# Patient Record
Sex: MALE | Race: WHITE | NOT HISPANIC OR LATINO | Employment: FULL TIME | URBAN - METROPOLITAN AREA
[De-identification: names, ages, dates, MRNs, and addresses within clinical notes are randomized per-mention and may not be internally consistent; named-entity substitution may affect disease eponyms.]

---

## 2024-04-08 PROBLEM — J11.1 INFLUENZA: Status: ACTIVE | Noted: 2024-04-08

## 2024-04-08 PROBLEM — J34.3 HYPERTROPHY OF INFERIOR NASAL TURBINATE: Status: ACTIVE | Noted: 2024-04-08

## 2024-04-08 PROBLEM — I10 HYPERTENSION: Status: ACTIVE | Noted: 2024-04-08

## 2024-04-08 PROBLEM — I25.10 CAD (CORONARY ARTERY DISEASE): Status: ACTIVE | Noted: 2024-04-08

## 2024-04-08 PROBLEM — J34.2 DEVIATED SEPTUM: Status: ACTIVE | Noted: 2024-04-08

## 2024-04-08 PROBLEM — G47.19 EXCESSIVE DAYTIME SLEEPINESS: Status: ACTIVE | Noted: 2024-04-08

## 2024-04-08 PROBLEM — G62.9 PERIPHERAL NEUROPATHY: Status: ACTIVE | Noted: 2024-04-08

## 2024-04-08 PROBLEM — J30.9 ALLERGIC RHINITIS: Status: ACTIVE | Noted: 2024-04-08

## 2024-04-08 PROBLEM — I21.4 NON-STEMI (NON-ST ELEVATED MYOCARDIAL INFARCTION) (MULTI): Status: ACTIVE | Noted: 2024-04-08

## 2024-04-08 PROBLEM — R68.82 LOSS OF LIBIDO: Status: ACTIVE | Noted: 2024-04-08

## 2024-04-08 PROBLEM — N52.9 ERECTILE DYSFUNCTION: Status: ACTIVE | Noted: 2024-04-08

## 2024-04-08 PROBLEM — R07.9 CHEST PAIN: Status: ACTIVE | Noted: 2024-04-08

## 2024-04-08 PROBLEM — R79.89 ABNORMAL CBC: Status: ACTIVE | Noted: 2024-04-08

## 2024-04-08 PROBLEM — E78.5 HYPERLIPIDEMIA: Status: ACTIVE | Noted: 2024-04-08

## 2024-04-08 PROBLEM — G45.9 TIA (TRANSIENT ISCHEMIC ATTACK): Status: ACTIVE | Noted: 2024-04-08

## 2024-04-08 PROBLEM — R20.2 PARESTHESIAS: Status: ACTIVE | Noted: 2024-04-08

## 2024-04-08 PROBLEM — R09.81 CHRONIC NASAL CONGESTION: Status: ACTIVE | Noted: 2024-04-08

## 2024-04-08 PROBLEM — G47.33 OBSTRUCTIVE SLEEP APNEA: Status: ACTIVE | Noted: 2024-04-08

## 2024-04-09 ENCOUNTER — OFFICE VISIT (OUTPATIENT)
Dept: CARDIOLOGY | Facility: HOSPITAL | Age: 59
End: 2024-04-09
Payer: COMMERCIAL

## 2024-04-09 ENCOUNTER — LAB (OUTPATIENT)
Dept: LAB | Facility: LAB | Age: 59
End: 2024-04-09
Payer: COMMERCIAL

## 2024-04-09 VITALS
DIASTOLIC BLOOD PRESSURE: 94 MMHG | HEART RATE: 75 BPM | HEIGHT: 69 IN | WEIGHT: 222.3 LBS | SYSTOLIC BLOOD PRESSURE: 137 MMHG | BODY MASS INDEX: 32.92 KG/M2

## 2024-04-09 DIAGNOSIS — I25.2 PAST MYOCARDIAL INFARCTION: ICD-10-CM

## 2024-04-09 DIAGNOSIS — I25.10 CORONARY ARTERY DISEASE INVOLVING NATIVE CORONARY ARTERY OF NATIVE HEART WITHOUT ANGINA PECTORIS: Primary | ICD-10-CM

## 2024-04-09 DIAGNOSIS — I25.10 CORONARY ARTERY DISEASE INVOLVING NATIVE CORONARY ARTERY OF NATIVE HEART WITHOUT ANGINA PECTORIS: ICD-10-CM

## 2024-04-09 DIAGNOSIS — E78.00 PURE HYPERCHOLESTEROLEMIA: ICD-10-CM

## 2024-04-09 DIAGNOSIS — I10 PRIMARY HYPERTENSION: ICD-10-CM

## 2024-04-09 LAB
ALBUMIN SERPL BCP-MCNC: 4.6 G/DL (ref 3.4–5)
ALP SERPL-CCNC: 44 U/L (ref 33–120)
ALT SERPL W P-5'-P-CCNC: 18 U/L (ref 10–52)
ANION GAP SERPL CALC-SCNC: 12 MMOL/L (ref 10–20)
AST SERPL W P-5'-P-CCNC: 18 U/L (ref 9–39)
ATRIAL RATE: 75 BPM
BILIRUB SERPL-MCNC: 0.9 MG/DL (ref 0–1.2)
BUN SERPL-MCNC: 20 MG/DL (ref 6–23)
CALCIUM SERPL-MCNC: 9.3 MG/DL (ref 8.6–10.3)
CHLORIDE SERPL-SCNC: 102 MMOL/L (ref 98–107)
CHOLEST SERPL-MCNC: 232 MG/DL (ref 0–199)
CHOLESTEROL/HDL RATIO: 3.3
CO2 SERPL-SCNC: 28 MMOL/L (ref 21–32)
CREAT SERPL-MCNC: 1.11 MG/DL (ref 0.5–1.3)
EGFRCR SERPLBLD CKD-EPI 2021: 78 ML/MIN/1.73M*2
ERYTHROCYTE [DISTWIDTH] IN BLOOD BY AUTOMATED COUNT: 12 % (ref 11.5–14.5)
EST. AVERAGE GLUCOSE BLD GHB EST-MCNC: 97 MG/DL
GLUCOSE SERPL-MCNC: 103 MG/DL (ref 74–99)
HBA1C MFR BLD: 5 %
HCT VFR BLD AUTO: 43.4 % (ref 41–52)
HDLC SERPL-MCNC: 70.8 MG/DL
HGB BLD-MCNC: 15.3 G/DL (ref 13.5–17.5)
LDLC SERPL CALC-MCNC: 143 MG/DL
MCH RBC QN AUTO: 32.7 PG (ref 26–34)
MCHC RBC AUTO-ENTMCNC: 35.3 G/DL (ref 32–36)
MCV RBC AUTO: 93 FL (ref 80–100)
NON HDL CHOLESTEROL: 161 MG/DL (ref 0–149)
NRBC BLD-RTO: 0 /100 WBCS (ref 0–0)
P AXIS: 67 DEGREES
P OFFSET: 201 MS
P ONSET: 142 MS
PLATELET # BLD AUTO: 144 X10*3/UL (ref 150–450)
POTASSIUM SERPL-SCNC: 4.3 MMOL/L (ref 3.5–5.3)
PR INTERVAL: 152 MS
PROT SERPL-MCNC: 6.9 G/DL (ref 6.4–8.2)
Q ONSET: 218 MS
QRS COUNT: 12 BEATS
QRS DURATION: 90 MS
QT INTERVAL: 386 MS
QTC CALCULATION(BAZETT): 431 MS
QTC FREDERICIA: 415 MS
R AXIS: 33 DEGREES
RBC # BLD AUTO: 4.68 X10*6/UL (ref 4.5–5.9)
SODIUM SERPL-SCNC: 138 MMOL/L (ref 136–145)
T AXIS: 28 DEGREES
T OFFSET: 411 MS
TRIGL SERPL-MCNC: 92 MG/DL (ref 0–149)
VENTRICULAR RATE: 75 BPM
VLDL: 18 MG/DL (ref 0–40)
WBC # BLD AUTO: 3.8 X10*3/UL (ref 4.4–11.3)

## 2024-04-09 PROCEDURE — 99214 OFFICE O/P EST MOD 30 MIN: CPT | Performed by: INTERNAL MEDICINE

## 2024-04-09 PROCEDURE — 82172 ASSAY OF APOLIPOPROTEIN: CPT

## 2024-04-09 PROCEDURE — 3080F DIAST BP >= 90 MM HG: CPT | Performed by: INTERNAL MEDICINE

## 2024-04-09 PROCEDURE — 80061 LIPID PANEL: CPT

## 2024-04-09 PROCEDURE — 36415 COLL VENOUS BLD VENIPUNCTURE: CPT

## 2024-04-09 PROCEDURE — 93005 ELECTROCARDIOGRAM TRACING: CPT | Performed by: INTERNAL MEDICINE

## 2024-04-09 PROCEDURE — 80053 COMPREHEN METABOLIC PANEL: CPT

## 2024-04-09 PROCEDURE — 99204 OFFICE O/P NEW MOD 45 MIN: CPT | Performed by: INTERNAL MEDICINE

## 2024-04-09 PROCEDURE — 3074F SYST BP LT 130 MM HG: CPT | Performed by: INTERNAL MEDICINE

## 2024-04-09 PROCEDURE — 85027 COMPLETE CBC AUTOMATED: CPT

## 2024-04-09 PROCEDURE — 1036F TOBACCO NON-USER: CPT | Performed by: INTERNAL MEDICINE

## 2024-04-09 PROCEDURE — 83036 HEMOGLOBIN GLYCOSYLATED A1C: CPT

## 2024-04-09 RX ORDER — AMLODIPINE BESYLATE 10 MG/1
10 TABLET ORAL DAILY
COMMUNITY

## 2024-04-09 RX ORDER — SILDENAFIL 100 MG/1
100 TABLET, FILM COATED ORAL
COMMUNITY
Start: 2017-06-09

## 2024-04-09 RX ORDER — ROSUVASTATIN CALCIUM 20 MG/1
20 TABLET, COATED ORAL DAILY
Qty: 90 TABLET | Refills: 3 | Status: SHIPPED | OUTPATIENT
Start: 2024-04-09 | End: 2025-04-09

## 2024-04-09 RX ORDER — OLMESARTAN MEDOXOMIL 40 MG/1
40 TABLET ORAL DAILY
Qty: 90 TABLET | Refills: 3 | Status: SHIPPED | OUTPATIENT
Start: 2024-04-09 | End: 2025-04-09

## 2024-04-09 RX ORDER — ASPIRIN 81 MG/1
1 TABLET ORAL DAILY
COMMUNITY
Start: 2016-07-19

## 2024-04-09 RX ORDER — LISINOPRIL 40 MG/1
40 TABLET ORAL DAILY
COMMUNITY
End: 2024-04-09 | Stop reason: ALTCHOICE

## 2024-04-09 ASSESSMENT — ENCOUNTER SYMPTOMS
DEPRESSION: 0
LOSS OF SENSATION IN FEET: 0
OCCASIONAL FEELINGS OF UNSTEADINESS: 0

## 2024-04-09 NOTE — PROGRESS NOTES
Primary Care Physician: Aston Beltran MD  Date of Visit: 04/09/2024  8:20 AM EDT  Location of visit: [facility]     Chief Complaint:   Chief Complaint   Patient presents with    Re-establish Care    Coronary Artery Disease    Hypertension        HPI / Summary:   Greg Ortiz is a 58 y.o. male presents to reestablish cardiovascular care.  I last saw him in 2021.  He has had no intervening history since then.  He has no cardiac complaints.  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  The patient denies chest pain, shortness of breath, palpitations, lightheadedness, syncope, orthopnea, paroxysmal nocturnal dyspnea, lower extremity edema, or bleeding problems.    He stopped taking statins several years ago.  He has a history of myalgias with rosuvastatin and atorvastatin.    He is a lifelong non-smoker.    He has no known drug allergies.    There is no family history of coronary artery disease or sudden death.  His mother had a DVT.    Review of systems positive for chronic low back and knee pain.  He has also gained approximately 50 pounds in the last 3 years.      Past Medical History:   Diagnosis Date    Other cerebral infarction due to occlusion or stenosis of small artery (CMS/[facility]) 08/14/2014    Lacunar stroke    Personal history of other diseases of the circulatory system     History of coronary artery disease    Personal history of other diseases of the nervous system and sense organs     History of sleep apnea    Personal history of other specified conditions     History of snoring        Past Surgical History:   Procedure Laterality Date    MANDIBLE SURGERY  11/24/2015    Jaw Surgery    OTHER SURGICAL HISTORY  11/24/2015    Previous Stent Placement          Social History:        Family History:  family history is not on file.      Allergies:  No Known Allergies    Outpatient Medications:  Current Outpatient  "Medications   Medication Instructions    amLODIPine (NORVASC) 10 mg, oral, Daily    aspirin 81 mg EC tablet 1 tablet, oral, Daily    lisinopril 40 mg, oral, Daily    sildenafil (VIAGRA) 100 mg, oral       Physical Exam:  Vitals:    04/09/24 0836 04/09/24 0837   BP: 129/90 (!) 137/94   BP Location: Left arm Right arm   Patient Position: Sitting Sitting   BP Cuff Size: Adult Adult   Pulse: 75    Weight: 101 kg (222 lb 4.8 oz)    Height: 1.753 m (5' 9\")      Wt Readings from Last 5 Encounters:   04/09/24 101 kg (222 lb 4.8 oz)   04/01/21 78.7 kg (173 lb 8 oz)   02/16/21 79.8 kg (176 lb 0.3 oz)     Body mass index is 32.83 kg/m².   General: Well-developed well-nourished in no acute distress  HEENT: Normocephalic atraumatic  Neck: Supple, JVP is normal negative hepatojugular reflux 2+ carotid pulses without bruit  Pulmonary: Normal respiratory effort, clear to auscultation  Cardiovascular: No right ventricular heave, normal S1 and S2, no murmurs rubs or gallops  Abdomen: Soft nontender nondistended  Extremities: Warm without edema 2+ radial pulses bilaterally 2+ dorsalis pedis pulses bilaterally  Neurologic: Alert and oriented x3  Psychiatric: Normal mood and affect     Last Labs:  CMP:  Recent Labs     02/15/21  0933 10/19/19  0941 10/01/19  1535    138 136   K 4.5 4.7 4.0    100 101   CO2 28 29 27   ANIONGAP 13 14 12   BUN 19 14 23   CREATININE 0.96 0.96 1.65*   GLUCOSE 87 80 118*     Recent Labs     02/15/21  0933 10/01/19  1535   ALBUMIN 4.3 4.4   ALKPHOS 52 46   ALT 26 35   AST 25 34   BILITOT 0.8 0.8     CBC:  Recent Labs     02/15/21  0933 10/01/19  1535 09/12/18  0850   WBC 3.4* 4.3* 3.1*   HGB 14.9 15.4 15.3   HCT 43.7 43.5 43.4   * 129* 138*   * 95 98     COAG:   Recent Labs     10/01/19  1535   INR 1.0     HEME/ENDO:No results for input(s): \"FERRITIN\", \"IRONSAT\", \"TSH\", \"HGBA1C\" in the last 64194 hours.   CARDIAC: No results for input(s): \"LDH\", \"CKMB\", \"TROPHS\", \"BNP\" in the last " "43070 hours.    No lab exists for component: \"CK\", \"CKMBP\"  Recent Labs     02/15/21  0933 10/19/19  0941 09/12/18  0850   CHOL 201* 224* 194   LDLF 100* 120* 122*   HDL 89.8 83.2 57.9   TRIG 55 105 73       Last Cardiology Tests:  ECG:  Electrocardiogram performed today that I reviewed shows normal sinus rhythm nonspecific ST abnormality.    Echo:  Echocardiogram November 6, 2015   CONCLUSIONS:   1. The left ventricular systolic function is normal with a 55-60% estimated ejection fraction.   2. Spectral Doppler shows an impaired relaxation pattern of left ventricular diastolic filling.   3. The left atrium is normal in size.    Cath:  Cardiac catheterization November 9, 2015  Coronary Lesion Summary:  Vessel Stenosis     Vessel Segment  LAD    90% stenosis proximal  LPL    80% stenosis mid  Ramus  90% stenosis mid  CONCLUSIONS:   1. Successful OCT guided PCI of the proximal LAD with overlapping 2.75 x 8 mm and 3.0 x 18 mm Xience Alpine drug-eluting stents post dilated up to 3.5 mm.   2. Successful PCI of the mid ramus with a 2.25 x 15 mm Xience Alpine post dilated up to 2.5 mm.    Cardiac catheterization November 5, 2015  Coronary Lesion Summary:  Vessel Stenosis      Vessel Segment             Length (mm)  LAD    90% stenosis  proximal  LAD    95% stenosis  distal third of the distal  OM 1   100% stenosis proximal                   12  LPL    70% stenosis  mid  Ramus  90% stenosis  mid  CONCLUSIONS:   1. Severe multivessel CAD in a left dominant system.   2. Culprit vessel(s): 1st obtuse marginal.   3. Successful PCI of the proximal first obtuse marginal branch with a 2.5 x 15 mm Xience Alpine drug-eluting stent postdilated to 2.5 mm.   4. Elevated LVEDP.   5. No aortic stenosis.    Stress Test:  Exercise nuclear stress test July 15, 2016  The patient performed a Valentin protocol and achieved 10.1 Mets and 92   % of age predicted maximum heart rate.  Exercise was terminated   secondary to dyspnea.     No changing " pattern is present between stress and rest to suggest   ischemia.  A mild fixed decrease is present within the inferior wall   typical for diaphragmatic attenuation artifact.     The left ventricular  ejection fraction measures 54 % (normal is   greater than 45 %).  Contractility is normal.     IMPRESSION: Normal stress myocardial perfusion imaging with   diaphragmatic attenuation artifact.      Cardiac Imaging:        Assessment/Plan   Diagnoses and all orders for this visit:  Coronary artery disease involving native coronary artery of native heart without angina pectoris  -     ECG 12 lead (Clinic Performed)  -     Comprehensive metabolic panel; Future  -     CBC; Future  -     Lipid panel; Future  -     Hemoglobin A1C; Future  -     Lipoprotein a; Future  -     rosuvastatin (Crestor) 20 mg tablet; Take 1 tablet (20 mg) by mouth once daily.  -     Lipid panel; Future  Pure hypercholesterolemia  -     Lipid panel; Future  -     Lipoprotein a; Future  -     Lipid panel; Future  Primary hypertension  -     olmesartan (BENIcar) 40 mg tablet; Take 1 tablet (40 mg) by mouth once daily.  -     Basic metabolic panel; Future  Past myocardial infarction    In summary Mr. Ortiz is a pleasant 58 year-old white male with a past medical history significant for coronary artery disease status post PCI to OM1 in the setting of a non-ST elevation myocardial infarction and subsequent staged PCI to his LAD and ramus with preserved LV function, hypertension, hyperlipidemia with statin intolerance, and prior TIA.  He is asymptomatic from a cardiac perspective.  I educated him the importance of physical activity, weight loss, diet, blood pressure control, and LDL lowering.  His blood pressure is not at goal.  I switched his lisinopril to olmesartan 40 mg daily.  I asked him to monitor his blood pressure at home.  He was agreeable to trying a statin again.  I started rosuvastatin 20 mg daily.  If he is not at goal I would recommend a  PCSK9 inhibitor.  I ordered labs as indicated below.  He should continue his other cardiovascular medications.  We will see him back in follow-up in 3 months.      Orders:  Orders Placed This Encounter   Procedures    Comprehensive metabolic panel    CBC    Lipid panel    Hemoglobin A1C    Lipoprotein a    Basic metabolic panel    Lipid panel    ECG 12 lead (Clinic Performed)      Followup Appts:  Future Appointments   Date Time Provider Department Center   [facility]20[facility]   7/23/2024  9:00 AM Lluvia Moreland, APRN-CNP [id number]           ____________________________________________________________  Brad Vergara MD  [facility]  [facility]

## 2024-04-09 NOTE — PATIENT INSTRUCTIONS
Stop lisinopril.  Start olmesartan 40 mg daily.  Start rosuvastatin 20 mg daily.  Check CMP, CBC, lipid profile, A1c, and LP(a) today.  Repeat a BMP in 1 to 2 weeks.  Repeat a fasting lipid profile in 6 to 8 weeks.  Increase your physical activity, modify your diet, and continue to work on losing weight.  Monitor your blood pressure at home.  Follow-up in 3 months.

## 2024-04-11 LAB — LPA SERPL-MCNC: 54 MG/DL

## 2024-05-30 NOTE — RESULT ENCOUNTER NOTE
Elevated LP(a).  Significantly elevated lipids.  Normal hemoglobin A1c.  Continue rosuvastatin recheck lipid profile in July as discussed.  Mildly reduced white blood cell count and platelet count similar to prior.  Follow-up with your primary physician.

## 2024-07-14 LAB
ATRIAL RATE: 75 BPM
P AXIS: 67 DEGREES
P OFFSET: 201 MS
P ONSET: 142 MS
PR INTERVAL: 152 MS
Q ONSET: 218 MS
QRS COUNT: 12 BEATS
QRS DURATION: 90 MS
QT INTERVAL: 386 MS
QTC CALCULATION(BAZETT): 431 MS
QTC FREDERICIA: 415 MS
R AXIS: 33 DEGREES
T AXIS: 28 DEGREES
T OFFSET: 411 MS
VENTRICULAR RATE: 75 BPM

## 2024-07-23 ENCOUNTER — APPOINTMENT (OUTPATIENT)
Dept: CARDIOLOGY | Facility: HOSPITAL | Age: 59
End: 2024-07-23
Payer: COMMERCIAL

## 2025-08-12 ENCOUNTER — APPOINTMENT (OUTPATIENT)
Dept: CARDIOLOGY | Facility: HOSPITAL | Age: 60
End: 2025-08-12
Payer: OTHER GOVERNMENT

## 2025-08-12 ENCOUNTER — HOSPITAL ENCOUNTER (EMERGENCY)
Facility: HOSPITAL | Age: 60
Discharge: HOME | End: 2025-08-12
Attending: EMERGENCY MEDICINE
Payer: OTHER GOVERNMENT

## 2025-08-12 ENCOUNTER — APPOINTMENT (OUTPATIENT)
Dept: RADIOLOGY | Facility: HOSPITAL | Age: 60
End: 2025-08-12
Payer: OTHER GOVERNMENT

## 2025-08-12 VITALS
WEIGHT: 210 LBS | BODY MASS INDEX: 31.1 KG/M2 | TEMPERATURE: 97.9 F | SYSTOLIC BLOOD PRESSURE: 119 MMHG | DIASTOLIC BLOOD PRESSURE: 85 MMHG | OXYGEN SATURATION: 98 % | RESPIRATION RATE: 13 BRPM | HEIGHT: 69 IN | HEART RATE: 53 BPM

## 2025-08-12 DIAGNOSIS — R42 LIGHTHEADEDNESS: Primary | ICD-10-CM

## 2025-08-12 LAB
ALBUMIN SERPL BCP-MCNC: 4.5 G/DL (ref 3.4–5)
ALP SERPL-CCNC: 48 U/L (ref 33–120)
ALT SERPL W P-5'-P-CCNC: 14 U/L (ref 10–52)
ANION GAP SERPL CALC-SCNC: 17 MMOL/L (ref 10–20)
AST SERPL W P-5'-P-CCNC: 19 U/L (ref 9–39)
BASOPHILS # BLD AUTO: 0.01 X10*3/UL (ref 0–0.1)
BASOPHILS NFR BLD AUTO: 0.2 %
BILIRUB SERPL-MCNC: 1.5 MG/DL (ref 0–1.2)
BUN SERPL-MCNC: 21 MG/DL (ref 6–23)
CALCIUM SERPL-MCNC: 9.9 MG/DL (ref 8.6–10.3)
CARDIAC TROPONIN I PNL SERPL HS: 15 NG/L (ref 0–20)
CARDIAC TROPONIN I PNL SERPL HS: 16 NG/L (ref 0–20)
CHLORIDE SERPL-SCNC: 98 MMOL/L (ref 98–107)
CO2 SERPL-SCNC: 26 MMOL/L (ref 21–32)
CREAT SERPL-MCNC: 0.96 MG/DL (ref 0.5–1.3)
EGFRCR SERPLBLD CKD-EPI 2021: >90 ML/MIN/1.73M*2
EOSINOPHIL # BLD AUTO: 0.05 X10*3/UL (ref 0–0.7)
EOSINOPHIL NFR BLD AUTO: 1.2 %
ERYTHROCYTE [DISTWIDTH] IN BLOOD BY AUTOMATED COUNT: 12.9 % (ref 11.5–14.5)
GLUCOSE SERPL-MCNC: 85 MG/DL (ref 74–99)
HCT VFR BLD AUTO: 46.6 % (ref 41–52)
HGB BLD-MCNC: 15.8 G/DL (ref 13.5–17.5)
IMM GRANULOCYTES # BLD AUTO: 0.01 X10*3/UL (ref 0–0.7)
IMM GRANULOCYTES NFR BLD AUTO: 0.2 % (ref 0–0.9)
LYMPHOCYTES # BLD AUTO: 1.3 X10*3/UL (ref 1.2–4.8)
LYMPHOCYTES NFR BLD AUTO: 31.8 %
MAGNESIUM SERPL-MCNC: 2.33 MG/DL (ref 1.6–2.4)
MCH RBC QN AUTO: 32.8 PG (ref 26–34)
MCHC RBC AUTO-ENTMCNC: 33.9 G/DL (ref 32–36)
MCV RBC AUTO: 97 FL (ref 80–100)
MONOCYTES # BLD AUTO: 0.39 X10*3/UL (ref 0.1–1)
MONOCYTES NFR BLD AUTO: 9.5 %
NEUTROPHILS # BLD AUTO: 2.33 X10*3/UL (ref 1.2–7.7)
NEUTROPHILS NFR BLD AUTO: 57.1 %
NRBC BLD-RTO: 0 /100 WBCS (ref 0–0)
PLATELET # BLD AUTO: 157 X10*3/UL (ref 150–450)
POTASSIUM SERPL-SCNC: 4.9 MMOL/L (ref 3.5–5.3)
PROT SERPL-MCNC: 7.5 G/DL (ref 6.4–8.2)
RBC # BLD AUTO: 4.82 X10*6/UL (ref 4.5–5.9)
SODIUM SERPL-SCNC: 136 MMOL/L (ref 136–145)
TSH SERPL-ACNC: 3.42 MIU/L (ref 0.44–3.98)
WBC # BLD AUTO: 4.1 X10*3/UL (ref 4.4–11.3)

## 2025-08-12 PROCEDURE — 84484 ASSAY OF TROPONIN QUANT: CPT | Performed by: EMERGENCY MEDICINE

## 2025-08-12 PROCEDURE — 80053 COMPREHEN METABOLIC PANEL: CPT | Performed by: EMERGENCY MEDICINE

## 2025-08-12 PROCEDURE — 93005 ELECTROCARDIOGRAM TRACING: CPT

## 2025-08-12 PROCEDURE — 84443 ASSAY THYROID STIM HORMONE: CPT | Performed by: EMERGENCY MEDICINE

## 2025-08-12 PROCEDURE — 85025 COMPLETE CBC W/AUTO DIFF WBC: CPT | Performed by: EMERGENCY MEDICINE

## 2025-08-12 PROCEDURE — 83735 ASSAY OF MAGNESIUM: CPT | Performed by: EMERGENCY MEDICINE

## 2025-08-12 PROCEDURE — 99285 EMERGENCY DEPT VISIT HI MDM: CPT | Mod: 25 | Performed by: EMERGENCY MEDICINE

## 2025-08-12 PROCEDURE — 71045 X-RAY EXAM CHEST 1 VIEW: CPT

## 2025-08-12 PROCEDURE — 36415 COLL VENOUS BLD VENIPUNCTURE: CPT | Performed by: EMERGENCY MEDICINE

## 2025-08-12 PROCEDURE — 71045 X-RAY EXAM CHEST 1 VIEW: CPT | Performed by: RADIOLOGY

## 2025-08-12 ASSESSMENT — PAIN - FUNCTIONAL ASSESSMENT: PAIN_FUNCTIONAL_ASSESSMENT: 0-10

## 2025-08-13 LAB
ATRIAL RATE: 70 BPM
P AXIS: 58 DEGREES
P OFFSET: 190 MS
P ONSET: 139 MS
PR INTERVAL: 144 MS
Q ONSET: 211 MS
QRS COUNT: 11 BEATS
QRS DURATION: 84 MS
QT INTERVAL: 382 MS
QTC CALCULATION(BAZETT): 412 MS
QTC FREDERICIA: 402 MS
R AXIS: 4 DEGREES
T AXIS: 41 DEGREES
T OFFSET: 402 MS
VENTRICULAR RATE: 70 BPM

## 2025-09-03 ENCOUNTER — E-VISIT (OUTPATIENT)
Dept: PRIMARY CARE | Facility: CLINIC | Age: 60
End: 2025-09-03

## 2025-09-03 DIAGNOSIS — L98.9 SCALP LESION: Primary | ICD-10-CM

## 2025-09-03 RX ORDER — BACITRACIN ZINC 500 UNIT/G
OINTMENT (GRAM) TOPICAL 2 TIMES DAILY
Qty: 14 G | Refills: 0 | Status: SHIPPED | OUTPATIENT
Start: 2025-09-03